# Patient Record
Sex: FEMALE | Race: BLACK OR AFRICAN AMERICAN | NOT HISPANIC OR LATINO | URBAN - METROPOLITAN AREA
[De-identification: names, ages, dates, MRNs, and addresses within clinical notes are randomized per-mention and may not be internally consistent; named-entity substitution may affect disease eponyms.]

---

## 2022-04-08 ENCOUNTER — EMERGENCY (EMERGENCY)
Age: 22
LOS: 1 days | Discharge: HOME | End: 2022-04-08
Admitting: PEDIATRICS
Payer: COMMERCIAL

## 2022-04-08 VITALS
RESPIRATION RATE: 17 BRPM | SYSTOLIC BLOOD PRESSURE: 128 MMHG | OXYGEN SATURATION: 99 % | DIASTOLIC BLOOD PRESSURE: 85 MMHG | TEMPERATURE: 99 F | HEART RATE: 90 BPM

## 2022-04-08 PROCEDURE — 99284 EMERGENCY DEPT VISIT MOD MDM: CPT

## 2022-04-08 RX ORDER — IBUPROFEN 200 MG
600 TABLET ORAL ONCE
Refills: 0 | Status: COMPLETED | OUTPATIENT
Start: 2022-04-08 | End: 2022-04-08

## 2022-04-08 RX ADMIN — Medication 600 MILLIGRAM(S): at 19:50

## 2022-04-08 NOTE — ED PROVIDER NOTE - CHPI ED SYMPTOMS NEG
no disorientation/no dizziness/no headache/no laceration/no loss of consciousness/no neck tenderness/no crying/no decreased eating/drinking/no difficulty bearing weight/no fussiness/no sleeping issues
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
## 2022-04-08 NOTE — ED PROVIDER NOTE - MUSCULOSKELETAL, MLM
Spine appears normal, range of motion is not limited, no muscle or joint tenderness EXCEPT: FROM of back. Minimal right lumbar muscle tenderness to palpation. Pulses/sensation/strength fully intact, capillary refill <2 seconds. Right upper arm with ecchymosis, no tenderness to palpation and FROM. Pt has scar from surgery when age 5 to right elbow. Pulses/sensation/strength fully intact, capillary refill <2 seconds. No edema.

## 2022-04-08 NOTE — ED PROVIDER NOTE - PATIENT PORTAL LINK FT
You can access the FollowMyHealth Patient Portal offered by [facility] by registering at the following website: [email or web address]. By joining [employer]’s FollowMyHealth portal, you will also be able to view your health information using other applications (apps) compatible with our system.

## 2022-04-08 NOTE — ED PROVIDER NOTE - ENMT, MLM
Airway patent, Nasal mucosa clear. Mouth with normal mucosa. Throat has no vesicles, no oropharyngeal exudates and uvula is midline. No hemotympanum. No otorrhea or rhinorrhea.
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
## 2022-04-08 NOTE — ED PROVIDER NOTE - EYES, MLM
Clear bilaterally, pupils equal, round and reactive to light. No periorbital edema, ecchymosis or tenderness